# Patient Record
Sex: FEMALE | URBAN - METROPOLITAN AREA
[De-identification: names, ages, dates, MRNs, and addresses within clinical notes are randomized per-mention and may not be internally consistent; named-entity substitution may affect disease eponyms.]

---

## 2019-10-08 ENCOUNTER — APPOINTMENT (RX ONLY)
Dept: URBAN - METROPOLITAN AREA CLINIC 126 | Facility: CLINIC | Age: 69
Setting detail: DERMATOLOGY
End: 2019-10-08

## 2019-10-08 DIAGNOSIS — Z41.9 ENCOUNTER FOR PROCEDURE FOR PURPOSES OTHER THAN REMEDYING HEALTH STATE, UNSPECIFIED: ICD-10-CM

## 2019-10-08 PROCEDURE — ? FILLERS

## 2019-10-08 NOTE — PROCEDURE: FILLERS
[place]ette Lines Filler  Volume In Cc: 0
Additional Area 5 Location: lower face lines
Map Statment: See [address] for Complete Details
Vermilion Lips Filler Volume In Cc: 0.7
Lot #: [id number]
Lot #: [id number]
Expiration Date (Month Year): 7/31/2021
Lot #: [id number]
Expiration Date (Month Year): 07-
Include Cannula Information In Note?: No
Expiration Date (Month Year): 4/20/2020
Additional Area 1 Location: lip volume
Lot #: [id number]
Additional Area 1 Location: smile lines
Additional Area 2 Location: lip lines upper
Additional Area 1 Location: lip lines
Detail Level: Zone
Additional Area 2 Volume In Cc: 0.3
Additional Area 2 Location: corners of mouth
Filler: Restylane Silk
Additional Area 3 Location: corners
Additional Area 2 Location: lip borders
Additional Area 4 Location: upper lip lines
Post-Care Instructions: Patient instructed to apply ice to reduce swelling.
Use Map Statement For Sites (Optional): Yes
Additional Area 4 Location: chin lines
Consent: Written consent obtained. Risks include but not limited to bruising, beading, irregular texture, ulceration, infection, allergic reaction, scar formation, incomplete augmentation, temporary nature, procedural pain.

## 2020-02-11 ENCOUNTER — APPOINTMENT (RX ONLY)
Dept: URBAN - METROPOLITAN AREA CLINIC 126 | Facility: CLINIC | Age: 70
Setting detail: DERMATOLOGY
End: 2020-02-11

## 2020-02-11 DIAGNOSIS — Z41.9 ENCOUNTER FOR PROCEDURE FOR PURPOSES OTHER THAN REMEDYING HEALTH STATE, UNSPECIFIED: ICD-10-CM

## 2020-02-11 PROCEDURE — ? FILLERS

## 2020-02-11 NOTE — PROCEDURE: FILLERS
Jawline Filler Volume In Cc: 0
Use Map Statement For Sites (Optional): Yes
Include Cannula Information In Note?: No
Additional Area 1 Location: earlobes
Map Statment: See [address] for Complete Details
Lot #: [id number]
Consent: Written consent obtained. Risks include but not limited to bruising, beading, irregular texture, ulceration, infection, allergic reaction, scar formation, incomplete augmentation, temporary nature, procedural pain.
Additional Area 2 Location: upper lip lines
Additional Area 1 Location: lip volume
Post-Care Instructions: Patient instructed to apply ice to reduce swelling.
Lot #: [id number]
Expiration Date (Month Year): 4/20/2020
Additional Area 3 Location: corners
Additional Area 2 Location: glabella
Filler: Restylane Silk
Expiration Date (Month Year): 7/31/2021
Additional Area 3 Volume In Cc: 0.2
Additional Area 3 Location: lower lip lines
Additional Area 4 Location: chin lines
Additional Area 1 Location: lip lines
Lot #: [id number]
Detail Level: Zone
Additional Area 5 Location: lower face lines
Expiration Date (Month Year): 1/31/2022
Additional Area 2 Location: lip borders
Vermilion Lips Filler Volume In Cc: 0.4
Lot #: [id number]
Additional Area 3 Location: corners of mouth
Expiration Date (Month Year): 10/3/2020